# Patient Record
Sex: FEMALE | Race: BLACK OR AFRICAN AMERICAN | NOT HISPANIC OR LATINO | URBAN - METROPOLITAN AREA
[De-identification: names, ages, dates, MRNs, and addresses within clinical notes are randomized per-mention and may not be internally consistent; named-entity substitution may affect disease eponyms.]

---

## 2020-05-01 ENCOUNTER — OUTPATIENT (OUTPATIENT)
Dept: OUTPATIENT SERVICES | Facility: HOSPITAL | Age: 30
LOS: 1 days | End: 2020-05-01
Payer: MEDICAID

## 2020-05-01 PROCEDURE — G9001: CPT

## 2020-05-11 DIAGNOSIS — Z71.89 OTHER SPECIFIED COUNSELING: ICD-10-CM

## 2021-08-14 ENCOUNTER — EMERGENCY (EMERGENCY)
Facility: HOSPITAL | Age: 31
LOS: 1 days | Discharge: HOME | End: 2021-08-14
Attending: EMERGENCY MEDICINE
Payer: MEDICAID

## 2021-08-14 VITALS
DIASTOLIC BLOOD PRESSURE: 76 MMHG | RESPIRATION RATE: 18 BRPM | SYSTOLIC BLOOD PRESSURE: 116 MMHG | OXYGEN SATURATION: 100 % | TEMPERATURE: 98 F | HEART RATE: 70 BPM

## 2021-08-14 VITALS
OXYGEN SATURATION: 100 % | HEART RATE: 97 BPM | HEIGHT: 66 IN | RESPIRATION RATE: 18 BRPM | WEIGHT: 164.91 LBS | SYSTOLIC BLOOD PRESSURE: 127 MMHG | DIASTOLIC BLOOD PRESSURE: 83 MMHG | TEMPERATURE: 99 F

## 2021-08-14 PROCEDURE — 99283 EMERGENCY DEPT VISIT LOW MDM: CPT

## 2021-08-14 PROCEDURE — 99284 EMERGENCY DEPT VISIT MOD MDM: CPT

## 2021-08-14 RX ORDER — IBUPROFEN 200 MG
600 TABLET ORAL ONCE
Refills: 0 | Status: COMPLETED | OUTPATIENT
Start: 2021-08-14 | End: 2021-08-14

## 2021-08-14 RX ORDER — OXYCODONE HYDROCHLORIDE 5 MG/1
5 TABLET ORAL ONCE
Refills: 0 | Status: DISCONTINUED | OUTPATIENT
Start: 2021-08-14 | End: 2021-08-14

## 2021-08-14 RX ORDER — ACETAMINOPHEN 500 MG
975 TABLET ORAL ONCE
Refills: 0 | Status: COMPLETED | OUTPATIENT
Start: 2021-08-14 | End: 2021-08-14

## 2021-08-14 RX ORDER — LIDOCAINE 4 G/100G
1 CREAM TOPICAL ONCE
Refills: 0 | Status: COMPLETED | OUTPATIENT
Start: 2021-08-14 | End: 2021-08-14

## 2021-08-14 RX ADMIN — LIDOCAINE 1 PATCH: 4 CREAM TOPICAL at 12:22

## 2021-08-14 RX ADMIN — Medication 600 MILLIGRAM(S): at 12:21

## 2021-08-14 RX ADMIN — Medication 975 MILLIGRAM(S): at 12:21

## 2021-08-14 NOTE — ED PROVIDER NOTE - OBJECTIVE STATEMENT
Pt is a 31 no PM presents with bilateral lower back pain for the past 2 days, non radiating, constant, worse while prone, worse with walking. Pt denies any trauma but notes pain after lifting a bed and lifting a heavy window. Has not taken anything for pain.   -loss of urine/BM. -saddle aesthesia; - F/c/sweats at night  Denies any previous illness, rashes.

## 2021-08-14 NOTE — ED PROVIDER NOTE - PATIENT PORTAL LINK FT
You can access the FollowMyHealth Patient Portal offered by [facility] by registering at the following website: [email or web address]. By joining [employer]’s FollowMyHealth portal, you will also be able to view your health information using other applications (apps) compatible with our system.

## 2021-08-14 NOTE — ED PROVIDER NOTE - CLINICAL SUMMARY MEDICAL DECISION MAKING FREE TEXT BOX
Pt is 30 yo f with no PM or trauma who presents with Pt is 30 yo f with no PM or trauma who presents with bilateral lower back pain after strenuous work. Pain is likely due to a herniated disc. Pain control in the ED>

## 2021-08-14 NOTE — ED ADULT NURSE NOTE - OBJECTIVE STATEMENT
Pt complaining of back pain.  Reports that 1 month ago while lifting a bed she Pt complaining of back pain.  Reports that 1 month ago while lifting a bed she felt sudden Pt complaining of back pain.  Reports that 1 month ago while lifting a bed she felt sudden onset lower back/buttock pain, yesterday when opening a window she again had same injury, this morning when she woke up pain was worse which is why she presents to ER.  No hx of back pain, injuries or surgeries.  Has not taken anything for pain control.  Pt is able to stand up, no midline tenderness, FROM x both legs with full and equal strength, no numbness/tingling, no incontinence, no falls.

## 2021-08-14 NOTE — ED PROVIDER NOTE - NSFOLLOWUPINSTRUCTIONS_ED_ALL_ED_FT
YOU WERE SEEN IN THE ED FOR: Lower back pain    WHILE HERE YOU HAD: A lidocaine patch, Tylenol 975mg, Ibuprofen 600mg.    PLEASE  MEDROL PACK FROM YOU PHARMACY.    FOR PAIN, YOU MAY TAKE TYLENOL (acetaminophen) AND/OR IBUPROFEN (Advil or Motrin). FOLLOW THE INSTRUCTIONS ON THE LABEL/CONTAINER.    PLEASE TAKE IT EASY AT HOME AND REST WITH MODERATE WALKING AROUND YOUR HOME. AVOID HEAVY LIFTING.     PLEASE FOLLOW UP WITH YOUR PRIVATE PHYSICIAN WITHIN THE NEXT 1-3 HOURS. BRING COPIES OF YOUR RESULTS.    RETURN TO THE EMERGENCY DEPARTMENT IF YOU EXPERIENCE ANY NEW/CONCERNING/WORSENING SYMPTOMS SUCH AS BUT NOT LIMITED TO: fever, worsening pain, loss of strength, loss of urinary or bowel continence or any new or worsening symptoms.  ---------------------------

## 2021-08-14 NOTE — ED PROVIDER NOTE - PHYSICAL EXAMINATION
CVS: +S1/S2,   RESP: Unlabored respiratory effort.   GI: Nontender/Nondistended,   MSK: No ttp of bilateral back, no midline tenderness, no vertebral stepoffs, -straight leg test. Ambulating independently, normal gait.   Skin: Warm, dry and intact.   Neuro: Motor & Sensation grossly intact.  Psych: Awake, Alert, & Oriented (AAO) x3. Appropriate mood and affect.

## 2021-08-14 NOTE — ED PROVIDER NOTE - NSFOLLOWUPCLINICS_GEN_ALL_ED_FT
[facility] - Primary Care  Primary Care  [address] [place] [address]  Phone: [phone]  Fax:     [facility] Internal Medicine  General Internal Medicine  [address] [place] [id number]  Phone: [phone]  Fax:     [facility]  Internal Medicine  [address] [place] [address]  Phone: [phone]  Fax: [phone]

## 2021-08-14 NOTE — ED ADULT NURSE REASSESSMENT NOTE - NS ED NURSE REASSESS COMMENT FT1
Pt declines oxycodone, states that she would feel comfortable being discharged at this time, does not appear to be in active distress

## 2024-03-30 ENCOUNTER — NON-APPOINTMENT (OUTPATIENT)
Age: 34
End: 2024-03-30

## 2025-01-28 ENCOUNTER — NON-APPOINTMENT (OUTPATIENT)
Age: 35
End: 2025-01-28